# Patient Record
Sex: MALE | Race: WHITE | NOT HISPANIC OR LATINO | URBAN - NONMETROPOLITAN AREA
[De-identification: names, ages, dates, MRNs, and addresses within clinical notes are randomized per-mention and may not be internally consistent; named-entity substitution may affect disease eponyms.]

---

## 2018-10-16 PROBLEM — H26.493: Noted: 2018-10-16

## 2018-10-16 PROBLEM — H02.834: Noted: 2018-10-16

## 2018-10-16 PROBLEM — H16.223: Noted: 2018-10-16

## 2018-10-16 PROBLEM — H02.831: Noted: 2018-10-16

## 2018-10-16 PROBLEM — H35.3131: Noted: 2018-10-16

## 2018-10-16 PROBLEM — Z96.1: Noted: 2018-10-16

## 2018-10-16 PROBLEM — H34.8310: Noted: 2018-10-16

## 2019-03-14 ENCOUNTER — IMPORTED ENCOUNTER (OUTPATIENT)
Dept: URBAN - NONMETROPOLITAN AREA CLINIC 1 | Facility: CLINIC | Age: 74
End: 2019-03-14

## 2019-03-14 PROCEDURE — 99213 OFFICE O/P EST LOW 20 MIN: CPT

## 2019-03-14 PROCEDURE — 92134 CPTRZ OPH DX IMG PST SGM RTA: CPT

## 2019-03-14 NOTE — PATIENT DISCUSSION
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 Eleno Cook at [address] noted on exam from Dr. Kat Jose done in the past stable- OCT done previously stable from previous - Continue to monitor  YAJAIRA OU- Discussed diagnosis in detail with patient- Discussed signs and symptoms of progression- Recommend patient drinking plenty of water and starting Omega 3’s - Recommend Refresh or Systane  throughout the day- Consider Restasis or plugs in the future if no improvement- Continue to monitorDermatochalasis OU- Discussed diagnosis with patient- Patient c/o eyelids drooping and obscurring his vision- Discussed referral to Dr. Tony West for lid evaluation but wait until after patient has been seen by [facility]- Continue to monitor Pseudophakia OU with PCO OU- Both intraocular lenses in place today and stable- PCO noted but stable and no treatment needed at this time - BAT done today 20/29 OU- No treatment required at this time- Continue to monitor Presbyopia OU- Discussed diagnosis in detail with patient - Continue to monitor; 's Notes: Optos 4/11/17OCT 3/14/19MR 10/16/18DFE 10/16/18

## 2020-06-01 ENCOUNTER — IMPORTED ENCOUNTER (OUTPATIENT)
Dept: URBAN - NONMETROPOLITAN AREA CLINIC 1 | Facility: CLINIC | Age: 75
End: 2020-06-01

## 2020-06-01 PROBLEM — H02.834: Noted: 2020-06-01

## 2020-06-01 PROBLEM — H34.8310: Noted: 2020-06-01

## 2020-06-01 PROBLEM — Z96.1: Noted: 2020-06-01

## 2020-06-01 PROBLEM — H35.3131: Noted: 2020-06-01

## 2020-06-01 PROBLEM — H52.4: Noted: 2020-06-01

## 2020-06-01 PROBLEM — H02.831: Noted: 2020-06-01

## 2020-06-01 PROBLEM — H16.223: Noted: 2020-06-01

## 2020-06-01 PROCEDURE — S0621 ROUTINE OPHTHALMOLOGICAL EXA: HCPCS

## 2020-06-01 NOTE — PATIENT DISCUSSION
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 Raquel Dunn at [address] noted on exam from Dr. Dana Lockhart done in the past stable- OCT done previously stable from previous - Continue to monitor  YAJAIRA OU- Discussed diagnosis in detail with patient- Discussed signs and symptoms of progression- Recommend patient drinking plenty of water and starting Omega 3’s - Recommend Refresh or Systane  throughout the day- Consider Restasis or plugs in the future if no improvement- Continue to monitorDermatochalasis OU- Discussed diagnosis with patient- Patient c/o eyelids drooping and obscurring his vision- Discussed referral to Dr. Ashutosh Gonzalez for lid evaluation but wait until after patient has been seen by [facility]- Continue to monitor Pseudophakia OU with PCO OU- Both intraocular lenses in place today and stable- PCO noted but stable and no treatment needed at this time - BAT done previously 20/29 OU- No treatment required at this time- Continue to monitor Presbyopia OU- Discussed diagnosis in detail with patient - New glasses RX given today- Continue to monitor; 's Notes: Optos 4/11/17OCT 3/14/19MR 6/1/20DFE 10/16/18

## 2021-06-07 ENCOUNTER — PREPPED CHART (OUTPATIENT)
Dept: URBAN - NONMETROPOLITAN AREA CLINIC 1 | Facility: CLINIC | Age: 76
End: 2021-06-07

## 2021-06-07 ENCOUNTER — IMPORTED ENCOUNTER (OUTPATIENT)
Dept: URBAN - NONMETROPOLITAN AREA CLINIC 1 | Facility: CLINIC | Age: 76
End: 2021-06-07

## 2021-06-07 PROCEDURE — 92015 DETERMINE REFRACTIVE STATE: CPT

## 2021-06-07 PROCEDURE — 92014 COMPRE OPH EXAM EST PT 1/>: CPT

## 2021-06-07 NOTE — PATIENT DISCUSSION
Presbyopia OU- Discussed diagnosis in detail with patient - New glasses RX given today- Continue to monitor ARMD OU- Discusseddiagnosis in detail with patient- Recommend patient continue eye vitamins daily such as Preservision- Recommend that patient continue following the[employer] patient to call or come into the office ASAP if any changes noted from today- OCT done perviously shows drusen OU but stable fromprevious- Continue to monitorBRVO OD - Discussed diagnosis with patient- Patient has been followed by Dr. Wilton La at [address] noted on examfrom Dr. Annie Jeans done in the past stable- OCT done previously stable from previous - Continue to monitor YAJAIRA OU- Discussed diagnosis indetail with patient- Discussed signs and symptoms of progression- Recommend patient drinking plenty of water and starting Omega 3?s - RecommendRefresh or Systane throughout the day- Consider Restasis or plugs in the future if no improvement- Continue to monitorDermatochalasis OU- Discusseddiagnosis with patient- Patient c/o eyelids drooping and obscurring his vision- Discussed referral to Dr. Jessica Reid for lid evaluation but wait until after patienthas been [facility]- Continue to monitor Pseudophakia OU with PCO OU- Both intraocular lenses in place today and stable- PCO noted but stableand no treatment needed at this time - BAT done previously 20/29 OU- No treatment required at this time- Continue to monitor.

## 2021-06-07 NOTE — PATIENT DISCUSSION
Presbyopia OU- Discussed diagnosis in detail with patient - New glasses RX given today- Continue to monitor ARMD OU- Discussed diagnosis in detail with patient- Recommend  patient continue eye vitamins daily such as Preservision- Recommend that patient continue following the [address] patient to call or come into the office ASAP if any changes noted from today- OCT done perviously shows drusen OU but stable from previous- Continue to monitorBRVO OD - Discussed diagnosis with patient- Patient has been followed by Dr. Eleno Cook at [address] noted on exam from Dr. Quirino Sher done in the past stable- OCT done previously stable from previous - Continue to monitor  YAJAIRA OU- Discussed diagnosis in detail with patient- Discussed signs and symptoms of progression- Recommend patient drinking plenty of water and starting Omega 3’s - Recommend Refresh or Systane  throughout the day- Consider Restasis or plugs in the future if no improvement- Continue to monitorDermatochalasis OU- Discussed diagnosis with patient- Patient c/o eyelids drooping and obscurring his vision- Discussed referral to Dr. Tony West for lid evaluation but wait until after patient has been [facility]- Continue to monitor Pseudophakia OU with PCO OU- Both intraocular lenses in place today and stable- PCO noted but stable and no treatment needed at this time - BAT done previously 20/29 OU- No treatment required at this time- Continue to monitor; 's Notes: Optos 4/11/17OCT 3/14/19MR 6/1/20DFE 10/16/18

## 2022-03-16 ASSESSMENT — TONOMETRY
OS_IOP_MMHG: 16
OD_IOP_MMHG: 16

## 2022-03-16 ASSESSMENT — VISUAL ACUITY
OS_CC: 20/25-2
OD_CC: 20/40-1

## 2022-03-18 ENCOUNTER — FOLLOW UP (OUTPATIENT)
Dept: URBAN - NONMETROPOLITAN AREA CLINIC 1 | Facility: CLINIC | Age: 77
End: 2022-03-18

## 2022-03-18 DIAGNOSIS — H35.3131: ICD-10-CM

## 2022-03-18 PROCEDURE — 99213 OFFICE O/P EST LOW 20 MIN: CPT

## 2022-03-18 PROCEDURE — 92134 CPTRZ OPH DX IMG PST SGM RTA: CPT

## 2022-03-18 ASSESSMENT — TONOMETRY
OS_IOP_MMHG: 14
OD_IOP_MMHG: 15

## 2022-03-18 ASSESSMENT — VISUAL ACUITY
OD_CC: 20/25-1
OS_CC: 20/30-1

## 2022-03-18 NOTE — PATIENT DISCUSSION
Discussed diagnosis in detail with patient. Signs/symptoms associated with progression discussed. Recommend  patient continue eye vitamins daily such as PreserVision. Recommend that patient follow the [address], patient to call or come into the office ASAP if any changes noted from today. Continue to monitor.

## 2022-03-18 NOTE — PATIENT DISCUSSION
Discussed diagnosis in detail with patient. No superior field of vision loss/complaint noted at this time. Continue to monitor.

## 2022-04-09 ASSESSMENT — VISUAL ACUITY
OD_SC: 20/25-/+
OD_SC: 20/29
OS_SC: 20/25-1
OD_GLARE: 20/29
OS_GLARE: 20/29
OS_SC: 20/25-2
OS_SC: 20/20-
OD_SC: 20/40-1

## 2022-04-09 ASSESSMENT — TONOMETRY
OS_IOP_MMHG: 16
OS_IOP_MMHG: 12
OS_IOP_MMHG: 16
OD_IOP_MMHG: 16
OD_IOP_MMHG: 13
OD_IOP_MMHG: 16

## 2022-07-14 ENCOUNTER — COMPREHENSIVE EXAM (OUTPATIENT)
Dept: URBAN - NONMETROPOLITAN AREA CLINIC 1 | Facility: CLINIC | Age: 77
End: 2022-07-14

## 2022-07-14 DIAGNOSIS — H34.8310: ICD-10-CM

## 2022-07-14 DIAGNOSIS — H26.491: ICD-10-CM

## 2022-07-14 DIAGNOSIS — H35.3131: ICD-10-CM

## 2022-07-14 PROCEDURE — 99213 OFFICE O/P EST LOW 20 MIN: CPT

## 2022-07-14 ASSESSMENT — VISUAL ACUITY
OS_CC: 20/25-2
OD_CC: 20/29

## 2022-07-14 NOTE — PATIENT DISCUSSION
Discussed findings in great detail w/pt today. He says when he closes his left eye, he notices that his right eye seems to be more blurry. PCO is not yet visually significant, in my opinion. However, I feel that the vision complaint today is related to the hx of BRVO OD and residual effects in vision.

## 2022-07-14 NOTE — PATIENT DISCUSSION
Patient previously followed closely/treated by Dr. Ronna Beauchamp. Will monitor for recurrence PRN.

## 2022-07-14 NOTE — PATIENT DISCUSSION
Discussed diagnosis in detail with patient. Signs/symptoms associated with progression discussed. Recommend  patient continue eye vitamins daily such as PreserVision. Recommend that patient follow the [address], patient to call or come into the office ASAP if any changes noted from today. Continue to monitor.

## 2022-07-14 NOTE — PATIENT DISCUSSION
Patient admits today, 7/14/22, that he is no longer taking the PreserVision. He didn't think it was helping him. Explained that he may not notice a difference in vision, we are just trying to get it to slow down the progression of the drusen/ARMD. Asked patient to look over the ingredients in the medication and make sure he doesn't overdose with his other OTC multivitamin he takes.

## 2022-09-19 ENCOUNTER — ESTABLISHED PATIENT (OUTPATIENT)
Dept: URBAN - NONMETROPOLITAN AREA CLINIC 1 | Facility: CLINIC | Age: 77
End: 2022-09-19

## 2022-09-19 DIAGNOSIS — H34.8310: ICD-10-CM

## 2022-09-19 DIAGNOSIS — H35.3221: ICD-10-CM

## 2022-09-19 PROCEDURE — 92134 CPTRZ OPH DX IMG PST SGM RTA: CPT

## 2022-09-19 PROCEDURE — 92014 COMPRE OPH EXAM EST PT 1/>: CPT

## 2022-09-19 ASSESSMENT — TONOMETRY
OS_IOP_MMHG: 16
OD_IOP_MMHG: 16

## 2022-09-19 NOTE — PATIENT DISCUSSION
Discussed diagnosis in detail with patient. Signs/symptoms associated with progression discussed. Recommend  patient continue eye vitamins daily such as PreserVision. Recommend that patient follow the [address], patient to call or come into the office ASAP if any changes noted from today. Continue to monitor.

## 2022-09-19 NOTE — PATIENT DISCUSSION
Discussed findings in detail w/ pt today. He reports to me that he has noticed a change in his vision since July, but has not reported it to me until now.

## 2022-09-19 NOTE — PATIENT DISCUSSION
Patient admitted 7/14/22, that he is no longer taking the PreserVision. He didn't think it was helping him. Explained that he may not notice a difference in vision, we are just trying to get it to slow down the progression of the drusen/ARMD. Asked patient to look over the ingredients in the medication and make sure he doesn't overdose with his other OTC multivitamin he takes. home

## 2022-09-19 NOTE — PATIENT DISCUSSION
Updated OCT today shows wet ARMD OS, recommend referral back to Dr. Willian Flaherty for further evaluation. Patient agrees with plan.

## 2022-09-22 ASSESSMENT — VISUAL ACUITY
OD_BAT: 20/400
OU_CC: 20/29-
OD_SC: 20/400
OS_BAT: 20/40
OS_SC: 20/25
OD_PH: 20/200
OS_CC: 20/29-
OD_CC: 20/400

## 2024-02-13 ENCOUNTER — ESTABLISHED PATIENT (OUTPATIENT)
Dept: URBAN - NONMETROPOLITAN AREA CLINIC 1 | Facility: CLINIC | Age: 79
End: 2024-02-13

## 2024-02-13 DIAGNOSIS — H52.4: ICD-10-CM

## 2024-02-13 PROCEDURE — 92015 DETERMINE REFRACTIVE STATE: CPT

## 2024-02-13 PROCEDURE — 92014 COMPRE OPH EXAM EST PT 1/>: CPT

## 2024-02-13 ASSESSMENT — VISUAL ACUITY: OS_CC: 20/29-2

## 2024-02-13 ASSESSMENT — TONOMETRY
OD_IOP_MMHG: 16
OS_IOP_MMHG: 16

## 2024-08-21 ENCOUNTER — FOLLOW UP (OUTPATIENT)
Dept: URBAN - NONMETROPOLITAN AREA CLINIC 1 | Facility: CLINIC | Age: 79
End: 2024-08-21

## 2024-08-21 DIAGNOSIS — H35.3221: ICD-10-CM

## 2024-08-21 DIAGNOSIS — H34.8310: ICD-10-CM

## 2024-08-21 PROCEDURE — 92134 CPTRZ OPH DX IMG PST SGM RTA: CPT

## 2024-08-21 PROCEDURE — 99213 OFFICE O/P EST LOW 20 MIN: CPT

## 2024-08-21 ASSESSMENT — VISUAL ACUITY
OU_CC: 20/50+2
OS_CC: 20/50+2
OD_PH: 20/400
OS_PH: 20/40
OD_CC: CF 3FT

## 2024-08-21 ASSESSMENT — TONOMETRY
OD_IOP_MMHG: 17
OS_IOP_MMHG: 16

## 2025-05-06 ENCOUNTER — FOLLOW UP (OUTPATIENT)
Age: 80
End: 2025-05-06

## 2025-05-06 DIAGNOSIS — H52.4: ICD-10-CM

## 2025-05-06 PROCEDURE — 92015 DETERMINE REFRACTIVE STATE: CPT

## 2025-05-06 PROCEDURE — 92014 COMPRE OPH EXAM EST PT 1/>: CPT
